# Patient Record
Sex: FEMALE | Race: BLACK OR AFRICAN AMERICAN | NOT HISPANIC OR LATINO | Employment: FULL TIME | URBAN - METROPOLITAN AREA
[De-identification: names, ages, dates, MRNs, and addresses within clinical notes are randomized per-mention and may not be internally consistent; named-entity substitution may affect disease eponyms.]

---
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
## 2020-05-15 ENCOUNTER — RX ONLY (OUTPATIENT)
Age: 20
Setting detail: RX ONLY
End: 2020-05-15

## 2020-05-15 RX ORDER — TRIAMCINOLONE ACETONIDE 1 MG/G
OINTMENT TOPICAL BID
Qty: 1 | Refills: 3 | Status: CANCELLED
Stop reason: CLARIF

## 2020-05-15 RX ORDER — HYDROCORTISONE 25 MG/G
OINTMENT TOPICAL BID
Qty: 1 | Refills: 3 | Status: CANCELLED
Stop reason: CLARIF

## 2020-09-15 ENCOUNTER — APPOINTMENT (RX ONLY)
Dept: URBAN - METROPOLITAN AREA CLINIC 28 | Facility: CLINIC | Age: 20
Setting detail: DERMATOLOGY
End: 2020-09-15

## 2020-09-15 DIAGNOSIS — Z02.9 ENCOUNTER FOR ADMINISTRATIVE EXAMINATIONS, UNSPECIFIED: ICD-10-CM

## 2020-09-15 PROCEDURE — ? REASON FOR TELEMEDICINE VISIT

## 2020-12-17 ENCOUNTER — RX ONLY (OUTPATIENT)
Age: 20
Setting detail: RX ONLY
End: 2020-12-17

## 2020-12-17 RX ORDER — CETIRIZINE HCL 10 MG
CAPSULE ORAL QDAY
Qty: 30 | Refills: 3 | Status: CANCELLED
Stop reason: CLARIF

## 2024-11-20 ENCOUNTER — APPOINTMENT (EMERGENCY)
Dept: RADIOLOGY | Facility: HOSPITAL | Age: 24
End: 2024-11-20
Attending: EMERGENCY MEDICINE
Payer: COMMERCIAL

## 2024-11-20 ENCOUNTER — HOSPITAL ENCOUNTER (OUTPATIENT)
Facility: HOSPITAL | Age: 24
Setting detail: OBSERVATION
Discharge: HOME | End: 2024-11-22
Attending: EMERGENCY MEDICINE | Admitting: STUDENT IN AN ORGANIZED HEALTH CARE EDUCATION/TRAINING PROGRAM
Payer: COMMERCIAL

## 2024-11-20 DIAGNOSIS — R06.02 SHORTNESS OF BREATH: ICD-10-CM

## 2024-11-20 DIAGNOSIS — J45.901 EXACERBATION OF ASTHMA, UNSPECIFIED ASTHMA SEVERITY, UNSPECIFIED WHETHER PERSISTENT: Primary | ICD-10-CM

## 2024-11-20 DIAGNOSIS — R60.0 BILATERAL LEG EDEMA: ICD-10-CM

## 2024-11-20 LAB
ALBUMIN SERPL-MCNC: 4.2 G/DL (ref 3.5–5.7)
ALP SERPL-CCNC: 92 IU/L (ref 34–125)
ALT SERPL-CCNC: 12 IU/L (ref 7–52)
ANION GAP SERPL CALC-SCNC: 5 MEQ/L (ref 3–15)
AST SERPL-CCNC: 12 IU/L (ref 13–39)
BASE EXCESS BLDV CALC-SCNC: -1.8 MEQ/L
BASOPHILS # BLD: 0.05 K/UL (ref 0.01–0.1)
BASOPHILS NFR BLD: 0.4 %
BILIRUB SERPL-MCNC: 0.4 MG/DL (ref 0.3–1.2)
BUN SERPL-MCNC: 8 MG/DL (ref 7–25)
CALCIUM SERPL-MCNC: 9.2 MG/DL (ref 8.6–10.3)
CHLORIDE SERPL-SCNC: 107 MEQ/L (ref 98–107)
CO2 BLDV-SCNC: 25 MEQ/L (ref 22–32)
CO2 SERPL-SCNC: 28 MEQ/L (ref 21–31)
CREAT SERPL-MCNC: 0.6 MG/DL (ref 0.6–1.2)
D DIMER PPP IA.FEU-MCNC: 0.44 UG/ML FEU (ref 0–0.5)
DIFFERENTIAL METHOD BLD: ABNORMAL
EGFRCR SERPLBLD CKD-EPI 2021: >60 ML/MIN/1.73M*2
EOSINOPHIL # BLD: 0.99 K/UL (ref 0.04–0.36)
EOSINOPHIL NFR BLD: 7.5 %
ERYTHROCYTE [DISTWIDTH] IN BLOOD BY AUTOMATED COUNT: 12.9 % (ref 11.7–14.4)
FIO2 ON VENT: ABNORMAL %
GLUCOSE SERPL-MCNC: 96 MG/DL (ref 70–99)
HCO3 BLDV-SCNC: 23.3 MEQ/L (ref 21–28)
HCT VFR BLD AUTO: 41.7 % (ref 35–45)
HGB BLD-MCNC: 13.8 G/DL (ref 11.8–15.7)
IMM GRANULOCYTES # BLD AUTO: 0.04 K/UL (ref 0–0.08)
IMM GRANULOCYTES NFR BLD AUTO: 0.3 %
INHALED O2 CONCENTRATION: ABNORMAL %
LYMPHOCYTES # BLD: 3.67 K/UL (ref 1.2–3.5)
LYMPHOCYTES NFR BLD: 27.7 %
MCH RBC QN AUTO: 28.4 PG (ref 28–33.2)
MCHC RBC AUTO-ENTMCNC: 33.1 G/DL (ref 32.2–35.5)
MCV RBC AUTO: 85.8 FL (ref 83–98)
MONOCYTES # BLD: 0.71 K/UL (ref 0.28–0.8)
MONOCYTES NFR BLD: 5.4 %
NEUTROPHILS # BLD: 7.78 K/UL (ref 1.7–7)
NEUTS SEG NFR BLD: 58.7 %
NRBC BLD-RTO: 0 %
PCO2 BLDV: 42 MM HG (ref 41–51)
PH BLDV: 7.36 [PH] (ref 7.32–7.42)
PLATELET # BLD AUTO: 363 K/UL (ref 150–369)
PMV BLD AUTO: 8.9 FL (ref 9.4–12.3)
PO2 BLDV: 61 MM HG (ref 25–40)
POTASSIUM SERPL-SCNC: 3.7 MEQ/L (ref 3.5–5.1)
PROT SERPL-MCNC: 7.6 G/DL (ref 6–8.2)
RBC # BLD AUTO: 4.86 M/UL (ref 3.93–5.22)
SODIUM SERPL-SCNC: 140 MEQ/L (ref 136–145)
TROPONIN I SERPL HS-MCNC: 3.9 PG/ML
TROPONIN I SERPL HS-MCNC: 4.4 PG/ML
WBC # BLD AUTO: 13.24 K/UL (ref 3.8–10.5)

## 2024-11-20 PROCEDURE — 93005 ELECTROCARDIOGRAM TRACING: CPT

## 2024-11-20 PROCEDURE — 36415 COLL VENOUS BLD VENIPUNCTURE: CPT

## 2024-11-20 PROCEDURE — 84484 ASSAY OF TROPONIN QUANT: CPT | Mod: 91 | Performed by: EMERGENCY MEDICINE

## 2024-11-20 PROCEDURE — G0378 HOSPITAL OBSERVATION PER HR: HCPCS

## 2024-11-20 PROCEDURE — 93005 ELECTROCARDIOGRAM TRACING: CPT | Performed by: EMERGENCY MEDICINE

## 2024-11-20 PROCEDURE — 85025 COMPLETE CBC W/AUTO DIFF WBC: CPT | Performed by: EMERGENCY MEDICINE

## 2024-11-20 PROCEDURE — 82803 BLOOD GASES ANY COMBINATION: CPT | Performed by: EMERGENCY MEDICINE

## 2024-11-20 PROCEDURE — 96375 TX/PRO/DX INJ NEW DRUG ADDON: CPT

## 2024-11-20 PROCEDURE — 96365 THER/PROPH/DIAG IV INF INIT: CPT

## 2024-11-20 PROCEDURE — 85379 FIBRIN DEGRADATION QUANT: CPT | Performed by: EMERGENCY MEDICINE

## 2024-11-20 PROCEDURE — 84484 ASSAY OF TROPONIN QUANT: CPT | Performed by: EMERGENCY MEDICINE

## 2024-11-20 PROCEDURE — 63600000 HC DRUGS/DETAIL CODE: Mod: JZ | Performed by: EMERGENCY MEDICINE

## 2024-11-20 PROCEDURE — 87637 SARSCOV2&INF A&B&RSV AMP PRB: CPT | Performed by: EMERGENCY MEDICINE

## 2024-11-20 PROCEDURE — 99223 1ST HOSP IP/OBS HIGH 75: CPT

## 2024-11-20 PROCEDURE — 99285 EMERGENCY DEPT VISIT HI MDM: CPT | Mod: 25

## 2024-11-20 PROCEDURE — 71045 X-RAY EXAM CHEST 1 VIEW: CPT

## 2024-11-20 PROCEDURE — 63700000 HC SELF-ADMINISTRABLE DRUG

## 2024-11-20 PROCEDURE — 85025 COMPLETE CBC W/AUTO DIFF WBC: CPT

## 2024-11-20 PROCEDURE — 80053 COMPREHEN METABOLIC PANEL: CPT | Performed by: EMERGENCY MEDICINE

## 2024-11-20 PROCEDURE — 25000000 HC PHARMACY GENERAL: Performed by: EMERGENCY MEDICINE

## 2024-11-20 RX ORDER — ALBUTEROL SULFATE 0.83 MG/ML
2.5 SOLUTION RESPIRATORY (INHALATION) EVERY 6 HOURS PRN
Status: DISCONTINUED | OUTPATIENT
Start: 2024-11-20 | End: 2024-11-21

## 2024-11-20 RX ORDER — ACETAMINOPHEN 325 MG/1
650 TABLET ORAL EVERY 4 HOURS PRN
Status: DISCONTINUED | OUTPATIENT
Start: 2024-11-20 | End: 2024-11-22 | Stop reason: HOSPADM

## 2024-11-20 RX ORDER — ALBUTEROL SULFATE 90 UG/1
2 INHALANT RESPIRATORY (INHALATION) 4 TIMES DAILY PRN
Status: ON HOLD | COMMUNITY
End: 2024-11-22

## 2024-11-20 RX ORDER — IPRATROPIUM BROMIDE AND ALBUTEROL SULFATE 2.5; .5 MG/3ML; MG/3ML
6 SOLUTION RESPIRATORY (INHALATION) ONCE
Status: COMPLETED | OUTPATIENT
Start: 2024-11-20 | End: 2024-11-20

## 2024-11-20 RX ORDER — ALBUTEROL SULFATE 0.83 MG/ML
2.5 SOLUTION RESPIRATORY (INHALATION) EVERY 6 HOURS
Status: DISCONTINUED | OUTPATIENT
Start: 2024-11-21 | End: 2024-11-22 | Stop reason: HOSPADM

## 2024-11-20 RX ORDER — DEXTROSE 50 % IN WATER (D50W) INTRAVENOUS SYRINGE
25 AS NEEDED
Status: DISCONTINUED | OUTPATIENT
Start: 2024-11-20 | End: 2024-11-22 | Stop reason: HOSPADM

## 2024-11-20 RX ORDER — DEXTROSE 40 %
15-30 GEL (GRAM) ORAL AS NEEDED
Status: DISCONTINUED | OUTPATIENT
Start: 2024-11-20 | End: 2024-11-22 | Stop reason: HOSPADM

## 2024-11-20 RX ORDER — IBUPROFEN 200 MG
16-32 TABLET ORAL AS NEEDED
Status: DISCONTINUED | OUTPATIENT
Start: 2024-11-20 | End: 2024-11-22 | Stop reason: HOSPADM

## 2024-11-20 RX ADMIN — MAGNESIUM SULFATE IN WATER 2 G: 40 INJECTION, SOLUTION INTRAVENOUS at 21:10

## 2024-11-20 RX ADMIN — IPRATROPIUM BROMIDE AND ALBUTEROL SULFATE 6 ML: .5; 3 SOLUTION RESPIRATORY (INHALATION) at 21:10

## 2024-11-20 RX ADMIN — METHYLPREDNISOLONE SODIUM SUCCINATE 125 MG: 125 INJECTION, POWDER, FOR SOLUTION INTRAMUSCULAR; INTRAVENOUS at 21:10

## 2024-11-20 RX ADMIN — ACETAMINOPHEN 650 MG: 325 TABLET ORAL at 23:20

## 2024-11-20 ASSESSMENT — COGNITIVE AND FUNCTIONAL STATUS - GENERAL
CLIMB 3 TO 5 STEPS WITH RAILING: 4 - NONE
STANDING UP FROM CHAIR USING ARMS: 4 - NONE
MOVING TO AND FROM BED TO CHAIR: 4 - NONE
WALKING IN HOSPITAL ROOM: 4 - NONE

## 2024-11-21 ENCOUNTER — APPOINTMENT (OUTPATIENT)
Dept: RADIOLOGY | Facility: HOSPITAL | Age: 24
Setting detail: OBSERVATION
End: 2024-11-21
Attending: STUDENT IN AN ORGANIZED HEALTH CARE EDUCATION/TRAINING PROGRAM
Payer: COMMERCIAL

## 2024-11-21 PROBLEM — J45.901 EXACERBATION OF ASTHMA, UNSPECIFIED ASTHMA SEVERITY, UNSPECIFIED WHETHER PERSISTENT: Status: ACTIVE | Noted: 2024-11-21

## 2024-11-21 PROBLEM — R60.0 BILATERAL LEG EDEMA: Status: ACTIVE | Noted: 2024-11-21

## 2024-11-21 PROBLEM — Z79.82 ASPIRIN LONG-TERM USE: Status: ACTIVE | Noted: 2024-11-21

## 2024-11-21 LAB
ANION GAP SERPL CALC-SCNC: 8 MEQ/L (ref 3–15)
ATRIAL RATE: 111
B-HCG UR QL: NEGATIVE
BASOPHILS # BLD: 0 K/UL (ref 0.01–0.1)
BASOPHILS NFR BLD: 0 %
BUN SERPL-MCNC: 7 MG/DL (ref 7–25)
CALCIUM SERPL-MCNC: 9 MG/DL (ref 8.6–10.3)
CHLORIDE SERPL-SCNC: 105 MEQ/L (ref 98–107)
CO2 SERPL-SCNC: 24 MEQ/L (ref 21–31)
CREAT SERPL-MCNC: 0.5 MG/DL (ref 0.6–1.2)
DIFFERENTIAL METHOD BLD: ABNORMAL
EGFRCR SERPLBLD CKD-EPI 2021: >60 ML/MIN/1.73M*2
EOSINOPHIL # BLD: 0.09 K/UL (ref 0.04–0.36)
EOSINOPHIL NFR BLD: 1 %
ERYTHROCYTE [DISTWIDTH] IN BLOOD BY AUTOMATED COUNT: 12.8 % (ref 11.7–14.4)
FLUAV RNA SPEC QL NAA+PROBE: NEGATIVE
FLUBV RNA SPEC QL NAA+PROBE: NEGATIVE
GLUCOSE SERPL-MCNC: 189 MG/DL (ref 70–99)
HCT VFR BLD AUTO: 40.3 % (ref 35–45)
HGB BLD-MCNC: 13.5 G/DL (ref 11.8–15.7)
LYMPHOCYTES # BLD: 1.32 K/UL (ref 1.2–3.5)
LYMPHOCYTES NFR BLD: 14 %
MAGNESIUM SERPL-MCNC: 2.1 MG/DL (ref 1.8–2.5)
MCH RBC QN AUTO: 28.9 PG (ref 28–33.2)
MCHC RBC AUTO-ENTMCNC: 33.5 G/DL (ref 32.2–35.5)
MCV RBC AUTO: 86.3 FL (ref 83–98)
MONOCYTES # BLD: 0.09 K/UL (ref 0.28–0.8)
MONOCYTES NFR BLD: 1 %
NEUTS BAND # BLD: 7.93 K/UL (ref 1.7–7)
NEUTS SEG NFR BLD: 84 %
P AXIS: 48
PLAT MORPH BLD: NORMAL
PLATELET # BLD AUTO: 356 K/UL (ref 150–369)
PLATELET # BLD EST: ABNORMAL 10*3/UL
PMV BLD AUTO: 9.7 FL (ref 9.4–12.3)
POTASSIUM SERPL-SCNC: 4.1 MEQ/L (ref 3.5–5.1)
PR INTERVAL: 134
QRS DURATION: 74
QT INTERVAL: 316
QTC CALCULATION(BAZETT): 429
R AXIS: 38
RBC # BLD AUTO: 4.67 M/UL (ref 3.93–5.22)
RBC MORPH BLD: NORMAL
RSV RNA SPEC QL NAA+PROBE: NEGATIVE
SARS-COV-2 RNA RESP QL NAA+PROBE: NEGATIVE
SODIUM SERPL-SCNC: 137 MEQ/L (ref 136–145)
T WAVE AXIS: 40
VENTRICULAR RATE: 111
WBC # BLD AUTO: 9.44 K/UL (ref 3.8–10.5)

## 2024-11-21 PROCEDURE — 93010 ELECTROCARDIOGRAM REPORT: CPT | Performed by: INTERNAL MEDICINE

## 2024-11-21 PROCEDURE — 63600000 HC DRUGS/DETAIL CODE

## 2024-11-21 PROCEDURE — 81025 URINE PREGNANCY TEST: CPT

## 2024-11-21 PROCEDURE — 99233 SBSQ HOSP IP/OBS HIGH 50: CPT | Performed by: STUDENT IN AN ORGANIZED HEALTH CARE EDUCATION/TRAINING PROGRAM

## 2024-11-21 PROCEDURE — 96376 TX/PRO/DX INJ SAME DRUG ADON: CPT

## 2024-11-21 PROCEDURE — 80048 BASIC METABOLIC PNL TOTAL CA: CPT

## 2024-11-21 PROCEDURE — 85025 COMPLETE CBC W/AUTO DIFF WBC: CPT

## 2024-11-21 PROCEDURE — 93970 EXTREMITY STUDY: CPT

## 2024-11-21 PROCEDURE — G0378 HOSPITAL OBSERVATION PER HR: HCPCS

## 2024-11-21 PROCEDURE — 21400000 HC ROOM AND CARE CCU/INTERMEDIATE

## 2024-11-21 PROCEDURE — 25000000 HC PHARMACY GENERAL

## 2024-11-21 PROCEDURE — 36415 COLL VENOUS BLD VENIPUNCTURE: CPT

## 2024-11-21 PROCEDURE — 63700000 HC SELF-ADMINISTRABLE DRUG: Performed by: STUDENT IN AN ORGANIZED HEALTH CARE EDUCATION/TRAINING PROGRAM

## 2024-11-21 PROCEDURE — 83735 ASSAY OF MAGNESIUM: CPT

## 2024-11-21 RX ORDER — ASPIRIN 81 MG/1
81 TABLET ORAL DAILY
Status: DISCONTINUED | OUTPATIENT
Start: 2024-11-21 | End: 2024-11-21

## 2024-11-21 RX ORDER — BUDESONIDE 0.5 MG/2ML
0.5 INHALANT ORAL
Status: DISCONTINUED | OUTPATIENT
Start: 2024-11-21 | End: 2024-11-22 | Stop reason: HOSPADM

## 2024-11-21 RX ORDER — ALBUTEROL SULFATE 0.83 MG/ML
2.5 SOLUTION RESPIRATORY (INHALATION) EVERY 4 HOURS PRN
Status: DISCONTINUED | OUTPATIENT
Start: 2024-11-21 | End: 2024-11-22 | Stop reason: HOSPADM

## 2024-11-21 RX ADMIN — ALBUTEROL SULFATE 2.5 MG: 2.5 SOLUTION RESPIRATORY (INHALATION) at 12:38

## 2024-11-21 RX ADMIN — ALBUTEROL SULFATE 2.5 MG: 2.5 SOLUTION RESPIRATORY (INHALATION) at 00:35

## 2024-11-21 RX ADMIN — METHYLPREDNISOLONE SODIUM SUCCINATE 30 MG: 40 INJECTION, POWDER, FOR SOLUTION INTRAMUSCULAR; INTRAVENOUS at 09:23

## 2024-11-21 RX ADMIN — METHYLPREDNISOLONE SODIUM SUCCINATE 30 MG: 40 INJECTION, POWDER, FOR SOLUTION INTRAMUSCULAR; INTRAVENOUS at 20:48

## 2024-11-21 RX ADMIN — BUDESONIDE 0.5 MG: 0.5 INHALANT RESPIRATORY (INHALATION) at 09:24

## 2024-11-21 RX ADMIN — ALBUTEROL SULFATE 2.5 MG: 2.5 SOLUTION RESPIRATORY (INHALATION) at 06:35

## 2024-11-21 RX ADMIN — BUDESONIDE 0.5 MG: 0.5 INHALANT RESPIRATORY (INHALATION) at 20:46

## 2024-11-21 RX ADMIN — ALBUTEROL SULFATE 2.5 MG: 2.5 SOLUTION RESPIRATORY (INHALATION) at 17:26

## 2024-11-21 RX ADMIN — ASPIRIN 81 MG: 81 TABLET, COATED ORAL at 09:23

## 2024-11-21 NOTE — PROGRESS NOTES
Spoke with patient and confirmed with medication list from [facility] and/or patient's own pharmacy to complete the medication reconciliation.     Prior to admission medication list:    Current Outpatient Medications:     albuterol 5 mg/mL nebulizer solution, Take 2.5 mg by nebulization every 6 (six) hours as needed for shortness of breath., Disp: , Rfl:     albuterol HFA 90 mcg/actuation inhaler, Inhale 2 puffs 4 (four) times a day as needed for wheezing., Disp: , Rfl:     aspirin 81 mg enteric coated tablet, Take 81 mg by mouth daily., Disp: , Rfl:     fluticasone-salmeterol (ADVAIR DISKUS) 250-50 mcg/dose diskus inhaler, Inhale 1 puff 2 (two) times a day. Rinse mouth with water after use to reduce aftertaste and incidence of candidiasis. Do not swallow., Disp: , Rfl:     hydrocortisone 0.5 % ointment, Apply topically 2 (two) times a day., Disp: , Rfl:      Comments about home medications:  -Patient finished course of prednisone.    Compliance:   -Consistent fills, no compliance concerns based on interview

## 2024-11-21 NOTE — ASSESSMENT & PLAN NOTE
Presenting with progressive dyspnea/wheeze consistent with asthma exacerbation, recent prednisone course without relief.   On advair/albuterol prior to admission   - continue IV solumedrol for now   - Standing albuterol nebs/Budesonide nebs  - Outpatient Pulm follow up

## 2024-11-21 NOTE — PLAN OF CARE
Care Coordination Admission Assessment Note    General Information:  Readmission Within the last 30 days: no previous admission in last 30 days  Does patient have a :    Patient-Specific Goals (include timeframe):      Living Arrangements:  Arrived From: home  Current Living Arrangements: home  People in Home: alone  Home Accessibility:    Living Arrangement Comments:      Housing Stability and Utility Access (SDOH):  In the last 12 months, was there a time when you were not able to pay the mortgage or rent on time?: No  In the past 12 months, how many times have you moved?:    At any time in the past 12 months, were you homeless or living in a shelter (including now)?: No  In the past 12 months has the electric, gas, oil, or water company threatened to shut off services in your home?: No    Functional Status Prior to Admission:   Assistive Device/Animal Currently Used at Home: inhaler, nebulizer  Functional Status Comments:    IADL Comments:       Supports and Services:  Current Outpatient/Agency/Support Group:    Type of Current Home Care Services:    History of home care episode or rehab stay: denies    Discharge Needs Assessment:   Concerns to be Addressed: denies needs/concerns at this time  Current Discharge Risk: lives alone, chronically ill  Anticipated Changes Related to Illness: none    Patient/Family Anticipated Discharge Plan:  Patient/Family Anticipates Transition To: home  Patient/Family Anticipated Services at Transition: none    Connection to Community  Not applicable    Patient Choice:   Offered/Gave Vendor List:    Patient's Choice of Community Agency(s):         Anticipated Discharge Plan:  Met with patient. Provided education and contact information for Care Coordination services.: yes  Anticipated Discharge Disposition: home with assistance     Transportation Needs (SDOH):  Transportation Concerns:    Transportation Anticipated: family or friend will provide  Is Out of Hospital  DNR needed at discharge?:      In the past 12 months, has lack of transportation kept you from medical appointments or from getting medications?: No  In the past 12 months, has lack of transportation kept you from meetings, work, or from getting things needed for daily living?: No    Concerns - comments: Met with patient bedside to discuss potential d/c needs. Patient lives alone independently, verified phone numbers and address. Patient does not have a PCP, [facility] list given. Her plan is for home, she has a ride. She is not Covid-vaccinated.

## 2024-11-21 NOTE — PLAN OF CARE
Plan of Care Review  Plan of Care Reviewed With: patient  Progress: improving  Outcome Evaluation: AAOx4. Sinus tach on the monitor; all other VSS. Patient reports ESQUIVEL when ambulating but not at rest. Tolerating room air well. Reported chest and back pain-PRN med administered with positive results. Calm and cooperative. Safety rounding complete. Call bell within reach.

## 2024-11-21 NOTE — ASSESSMENT & PLAN NOTE
Patient reports long-term aspirin use for hx of headaches; No hx of TIA, CAD, PAD  - Recommended that she stop taking daily aspirin for this use  - D/c aspirin on discharge

## 2024-11-21 NOTE — ED PROVIDER NOTES
Emergency Medicine Note  HPI   HISTORY OF PRESENT ILLNESS     HPI patient is a 24-year-old woman with history of asthma.  She has required intubation in the past for her asthma.  She says she was at outside hospital 2 weeks ago for asthma exacerbation.  She was treated with nebs and steroids and discharged home to continue home treatment.  She says she completed treatment, and was never feeling improved.  She has continued to have significant wheezing and feels increased work of breathing and shortness of breath.  She notes no new fevers or chills.  No productive cough.  She gets chest tightness that is typical of asthma exacerbation.  She notes no triggers for this episode.  She has been using her home inhaler around-the-clock without improvement.  Her last steroid dose was 1.5 weeks ago.      Patient History   PAST HISTORY     Reviewed from Nursing Triage:       Past Medical History:   Diagnosis Date    Asthma     Chronic    Chronic eczema     Eczema     Hayfever        Past Surgical History   Procedure Laterality Date    [place] tooth extraction         Family History   Problem Relation Name Age of Onset    Asthma Biological Mother      Asthma Biological Father      Asthma Other         Social History     Tobacco Use    Smoking status: Never    Smokeless tobacco: Never   Substance Use Topics    Alcohol use: No    Drug use: No         Review of Systems   REVIEW OF SYSTEMS     Review of Systems      VITALS     ED Vitals      Date/Time Temp Pulse Resp BP SpO2 [facility]   11/20/24 2106 -- 108 20 131/63 100 % LB   11/20/24 1926 37.5 °C (99.5 °F) 111 21 164/114 97 % GL                         Physical Exam   PHYSICAL EXAM     Physical Exam  Vitals and nursing note reviewed.   Constitutional:       General: She is not in acute distress.     Appearance: She is not ill-appearing or diaphoretic.   HENT:      Head: Normocephalic and atraumatic.      Right Ear: External ear normal.      Left Ear: External ear normal.      Nose:  Nose normal.      Mouth/Throat:      Mouth: Mucous membranes are moist.   Eyes:      Extraocular Movements: Extraocular movements intact.   Cardiovascular:      Rate and Rhythm: Normal rate and regular rhythm.      Pulses: Normal pulses.   Pulmonary:      Effort: Tachypnea and respiratory distress present.      Breath sounds: Wheezing present.      Comments: Inspiratory and expiratory wheezing to bilateral diffuse lungs  Chest:      Chest wall: No mass or tenderness.   Abdominal:      General: There is no distension.      Tenderness: There is no abdominal tenderness.   Musculoskeletal:         General: Normal range of motion.      Cervical back: Normal range of motion.      Right lower leg: No edema.      Left lower leg: No edema.   Skin:     General: Skin is warm and dry.      Coloration: Skin is not cyanotic or pale.   Neurological:      General: No focal deficit present.      Mental Status: She is alert.   Psychiatric:         Mood and Affect: Mood normal.           PROCEDURES     Procedures     DATA     Results       Procedure Component Value Units Date/Time    D-dimer, quantitative [[id number]]  (Normal) Collected: 11/20/24 1931    Specimen: Blood, Venous Updated: 11/20/24 2151     D-Dimer, Quant 0.44 ug/mL FEU      Comment: The D-Dimer assay can be used as an aid in the diagnosis of DVT or PE. The test can not be used by itself to exclude DVT or PE. When used as a diagnostic aid, the cutoff value is the same as the reference range: <0.5 ug/ml FEU.       HS Troponin I (with 2 hour reflex) [[id number]]  (Normal) Collected: 11/20/24 1931    Specimen: Blood, Venous Updated: 11/20/24 2028     High Sens Troponin I 4.4 pg/mL     Comprehensive metabolic panel [[id number]]  (Abnormal) Collected: 11/20/24 1931    Specimen: Blood, Venous Updated: 11/20/24 2022     Sodium 140 mEQ/L      Potassium 3.7 mEQ/L      Comment: Results obtained on plasma. Plasma Potassium values may be up to 0.4 mEQ/L less than serum values. The  differences may be greater for patients with high platelet or white cell counts.        Chloride 107 mEQ/L      CO2 28 mEQ/L      BUN 8 mg/dL      Creatinine 0.6 mg/dL      Glucose 96 mg/dL      Calcium 9.2 mg/dL      AST (SGOT) 12 IU/L      ALT (SGPT) 12 IU/L      Alkaline Phosphatase 92 IU/L      Total Protein 7.6 g/dL      Comment: Test performed on plasma which typically contains approximately 0.4 g/dL more protein than serum.        Albumin 4.2 g/dL      Bilirubin, Total 0.4 mg/dL      eGFR >60.0 mL/min/1.73m*2      Comment: Calculation based on the Chronic Kidney Disease Epidemiology Collaboration (CKD-EPI) equation refit without adjustment for race.        Anion Gap 5 mEQ/L     CBC and differential [[id number]]  (Abnormal) Collected: 11/20/24 1931    Specimen: Blood, Venous Updated: 11/20/24 1958     WBC 13.24 K/uL      RBC 4.86 M/uL      Hemoglobin 13.8 g/dL      Hematocrit 41.7 %      MCV 85.8 fL      MCH 28.4 pg      MCHC 33.1 g/dL      RDW 12.9 %      Platelets 363 K/uL      MPV 8.9 fL      Differential Type Auto     nRBC 0.0 %      Immature Granulocytes 0.3 %      Neutrophils 58.7 %      Lymphocytes 27.7 %      Monocytes 5.4 %      Eosinophils 7.5 %      Basophils 0.4 %      Immature Granulocytes, Absolute 0.04 K/uL      Neutrophils, Absolute 7.78 K/uL      Lymphocytes, Absolute 3.67 K/uL      Monocytes, Absolute 0.71 K/uL      Eosinophils, Absolute 0.99 K/uL      Basophils, Absolute 0.05 K/uL     [place] Draw Panel [[id number]] Collected: 11/20/24 1931    Specimen: Blood, Venous Updated: 11/20/24 1950    Narrative:      The following orders were created for panel order [place] Draw Panel.  Procedure                               Abnormality         Status                     ---------                               -----------         ------                     RAINBOW LT BLUE[[id number]]                                  In process                   Please view results for these tests on the individual  orders.    MAHESH CACERES [[id number]] Collected: 24    Specimen: Blood, Venous Updated: 24            Imaging Results              X-RAY CHEST 1 VIEW (In process)                     ECG 12 lead    (Results Pending)       Scoring tools                                  ED Course & MDM   MDM / ED COURSE / CLINICAL IMPRESSION / DISPO     Medical Decision Making      ED Course as of 24 EK, sinus tach, no significant ST or T wave abnormalities. [TP]    Chest x-ray reviewed by myself.  I did not see any acute infiltrate.  Patient with significant wheezing on exam.  Will reevaluate after nebs, magnesium, steroids. [TP]    Pending callback from hospitalist.  [TP]    Patient is feeling improved in the ED after initial treatments.  Her inspiratory wheezes have improved.  She persists with expiratory wheezes.  She is requiring admission to the hospitalist.  She will require further nebs, steroids.  D-dimer is negative.  Troponin is negative. [TP]    Hospitalist requested VBG [TP]      ED Course User Index  [TP] Tasneem Ponce MD     Clinical Impression      Exacerbation of asthma, unspecified asthma severity, unspecified whether persistent   Shortness of breath     _________________       ED Disposition   Admit / Observation                       Tasneem Ponce MD  24       Tasneem Ponce MD  24

## 2024-11-21 NOTE — PROGRESS NOTES
Hospital Medicine     Daily Progress Note       SUBJECTIVE   Interval History: Patient seen at the bedside this morning. She reports feeling better today in terms of her SOB and wheezing, but her symptoms are still prominent. She also continues to have some minor chest pains. Lastly, she is reporting pains in her bilateral legs, worse in the R leg.      OBJECTIVE      Vital signs in last 24 hours:  Temp:  [36.5 °C (97.7 °F)-37.5 °C (99.5 °F)] 36.9 °C (98.4 °F)  Heart Rate:  [] 89  Resp:  [20-21] 20  BP: (112-164)/() 136/71  No intake or output data in the 24 hours ending 11/21/24 1214    PHYSICAL EXAMINATION      Physical Exam    GEN: Well-appearing, Comfortable, No acute distress, Alert and oriented x3  HEENT: PERRL, EOMI, Moist mucous membranes, Neck supple, No JVD  CV: Regular rate and rhythm, Normal S1/S2, No murmurs, No rubs  PULM:  Non-labored breathing on room air, Diffuse bilateral expiratory > inspiratory wheezing   ABD: Obese, Soft, Nontender, Non-distended, Bowel sounds present   EXT: R > L leg circumference with diffuse bilateral edema and tenderness  NEURO: CN II-XII grossly intact, No focal motor or sensory deficits  : No mata catheter  PSYCH: Appropriate mood and affect, Full range     LINES, CATHETERS, DRAINS, AIRWAYS, AND WOUNDS   Lines, Drains, and Airways:  Wounds (agree with documentation and present on admission):  Peripheral IV (Adult) 11/20/24 Right Antecubital (Active)   Number of days: 1         Comments:    LABS / IMAGING / TELE      Labs  I have reviewed the patient's pertinent labs. Pertinent labs are within normal limits.      Imaging  I have independently reviewed the pertinent imaging from the last 24 hrs.    ECG/Telemetry  I have independently reviewed the telemetry. No events for the last 24 hours.    ASSESSMENT AND PLAN        Assessment & Plan  Acute asthma exacerbation  Patient presenting with progressive dyspnea/wheeze consistent with acute asthma  exacerbation  She failed outpatient management with prednisone course prescribed on 11/7  She reports around-the-clock use of nebs/inhalers at home without relief for the last 2 weeks  She is non-hypoxic here; CXR unremarkable  Unclear trigger, Patient suspects weather change; She does not smoke  - Continue IV Solumedrol 30mg q12h today, Wean to PO prednisone on discharge  - Continue albuterol and budesonide nebs  - Recommend outpatient Pulmonology follow-up  Aspirin long-term use  Patient reports long-term aspirin use for hx of headaches; No hx of TIA, CAD, PAD  - Recommended that she stop taking daily aspirin for this use  - D/c aspirin on discharge  Bilateral leg edema  Bilateral R > L leg edema on exam a/w tenderness  - No e/o DVT on duplex US    VTE Assessment: [place]    VTE Prophylaxis:  Current anticoagulants:    None      Code Status: Full Code      Estimated Discharge Date: 11/22/2024   Disposition Planning: Home tomorrow if improvement in lung exam/symptoms     Maria Del Rosario Lomeli MD  11/21/2024

## 2024-11-21 NOTE — H&P
"   Hospital Medicine  History & Physical        CHIEF COMPLAINT   Shortness of breath      HISTORY OF PRESENT ILLNESS      Hitesh Levy is a 24 y.o. female with a past medical history of asthma who presents with shortness of breath.      Reports recent presentation to ED for asthma exacerbation, D/Cd with prednisone. Reports completed steroid course but never felt improved. Reports she has been using  her inhaler and nebs \"non stop\". Reports yesterday symptoms worsened so she presented to the ED. She reports chest pain and wheeze, no fevers, chills. Reports Hx of intubation in setting of asthma exacerbation. Does not smoke.     In the ED, initial VS with tachycardia and HTN. Initial labs with Cr 0.6, LFTs wnl, no leukocytosis, D dimer wnl VBG 7.36/42, CXR without consolidation. Patient given albuterol neb, IV solumedrol and IV mag in ED, admitted for further management.     PAST MEDICAL AND SURGICAL HISTORY      Past Medical History:   Diagnosis Date    Asthma     Chronic    Chronic eczema     Eczema     Hayfever        Past Surgical History   Procedure Laterality Date    [place] tooth extraction         PCP: [facility], Pcp Missing    MEDICATIONS      Prior to Admission medications    Medication Sig Start Date End Date Taking? Authorizing Provider   albuterol 5 mg/mL nebulizer solution Take 2.5 mg by nebulization every 6 (six) hours as needed for shortness of breath.   Yes Kayli Armstrong MD   albuterol HFA 90 mcg/actuation inhaler Inhale 2 puffs 4 (four) times a day as needed for wheezing.   Yes Leydi Armstrong MD   aspirin 81 mg enteric coated tablet Take 81 mg by mouth daily.   Yes Kayli Armstrong MD   fluticasone-salmeterol (ADVAIR DISKUS) 250-50 mcg/dose diskus inhaler Inhale 1 puff 2 (two) times a day. Rinse mouth with water after use to reduce aftertaste and incidence of candidiasis. Do not swallow.   Yes Kayli Armstrong MD   hydrocortisone 0.5 % ointment Apply " topically 2 (two) times a day.   Yes ProviderKayli MD   prenatal vits96/iron fum/folic (PRE-SHERIDAN VITAMIN) 27 mg iron- 800 mcg tablet Take by mouth daily.  24  Kayli Armstrong MD       ALLERGIES      Egg, Iodine, Nut - unspecified, Penicillins, Red dye, and Shellfish derived    FAMILY HISTORY      Family History   Problem Relation Name Age of Onset    Asthma Biological Mother      Asthma Biological Father      Asthma Other         SOCIAL HISTORY      Social History     Socioeconomic History    Marital status: Single   Tobacco Use    Smoking status: Never    Smokeless tobacco: Never   Substance and Sexual Activity    Alcohol use: No    Drug use: No    Sexual activity: Yes     Partners: Male     Social Drivers of Health     Food Insecurity: No Food Insecurity (2024)    Hunger Vital Sign     Worried About Running Out of Food in the Last Year: Never true     Ran Out of Food in the Last Year: Never true       REVIEW OF SYSTEMS      All other systems reviewed and negative except as noted in HPI    PHYSICAL EXAMINATION      Temp:  [37.5 °C (99.5 °F)] 37.5 °C (99.5 °F)  Heart Rate:  [108-111] 108  Resp:  [20-21] 20  BP: (131-164)/() 131/63  There is no height or weight on file to calculate BMI.    Physical Exam  Constitutional:       Appearance: Normal appearance.   Cardiovascular:      Rate and Rhythm: Regular rhythm. Tachycardia present.      Heart sounds: No murmur heard.     No friction rub. No gallop.   Pulmonary:      Effort: Pulmonary effort is normal.      Breath sounds: Wheezing present. No rhonchi or rales.   Abdominal:      General: Abdomen is flat. There is no distension.      Palpations: Abdomen is soft.      Tenderness: There is no abdominal tenderness.   Musculoskeletal:      Right lower leg: No edema.      Left lower leg: No edema.   Skin:     General: Skin is warm and dry.   Neurological:      Mental Status: She is alert and oriented to person, place, and time.   Psychiatric:          Mood and Affect: Mood normal.         Behavior: Behavior normal.         LABS / IMAGING / EKG        Labs  See above     Imaging  I have independently reviewed the pertinent imaging from the last 24 hrs.      ECG/Telemetry  In process     ASSESSMENT AND PLAN              Assessment & Plan  Acute asthma exacerbation  Presenting with progressive dyspnea/wheeze consistent with asthma exacerbation, recent prednisone course without relief.   On advair/albuterol prior to admission   - continue IV solumedrol for now   - Standing albuterol nebs/Budesonide nebs  - Outpatient Pulm follow up     VTE Assessment: [place]    VTE Prophylaxis: Current anticoagulants:    None      Code Status: Full Code      Discussed advanced care planning.   Estimated Discharge Date: 11/21/2024  Disposition Planning: Home pending improvement in symptoms      Ced Shepard MD  11/20/2024

## 2024-11-21 NOTE — ASSESSMENT & PLAN NOTE
Patient presenting with progressive dyspnea/wheeze consistent with acute asthma exacerbation  She failed outpatient management with prednisone course prescribed on 11/7  She reports around-the-clock use of nebs/inhalers at home without relief for the last 2 weeks  She is non-hypoxic here; CXR unremarkable  Unclear trigger, Patient suspects weather change; She does not smoke  - Continue IV Solumedrol 30mg q12h today, Wean to PO prednisone on discharge  - Continue albuterol and budesonide nebs  - Recommend outpatient Pulmonology follow-up

## 2024-11-22 VITALS
TEMPERATURE: 98.4 F | OXYGEN SATURATION: 98 % | RESPIRATION RATE: 20 BRPM | HEIGHT: 64 IN | BODY MASS INDEX: 50.02 KG/M2 | HEART RATE: 113 BPM | DIASTOLIC BLOOD PRESSURE: 60 MMHG | SYSTOLIC BLOOD PRESSURE: 130 MMHG | WEIGHT: 293 LBS

## 2024-11-22 PROCEDURE — 25000000 HC PHARMACY GENERAL

## 2024-11-22 PROCEDURE — 63700000 HC SELF-ADMINISTRABLE DRUG

## 2024-11-22 PROCEDURE — 63600000 HC DRUGS/DETAIL CODE: Mod: JW

## 2024-11-22 PROCEDURE — 99238 HOSP IP/OBS DSCHRG MGMT 30/<: CPT | Performed by: STUDENT IN AN ORGANIZED HEALTH CARE EDUCATION/TRAINING PROGRAM

## 2024-11-22 PROCEDURE — G0378 HOSPITAL OBSERVATION PER HR: HCPCS

## 2024-11-22 RX ORDER — PREDNISONE 10 MG/1
TABLET ORAL
Qty: 30 TABLET | Refills: 0 | Status: SHIPPED | OUTPATIENT
Start: 2024-11-23 | End: 2024-12-03

## 2024-11-22 RX ORDER — FLUTICASONE PROPIONATE AND SALMETEROL 250; 50 UG/1; UG/1
1 POWDER RESPIRATORY (INHALATION) 2 TIMES DAILY
Qty: 60 EACH | Refills: 0 | Status: SHIPPED | OUTPATIENT
Start: 2024-11-22 | End: 2024-12-22

## 2024-11-22 RX ORDER — ALBUTEROL SULFATE 90 UG/1
2 INHALANT RESPIRATORY (INHALATION) 4 TIMES DAILY PRN
Qty: 8.5 G | Refills: 1 | Status: SHIPPED | OUTPATIENT
Start: 2024-11-22 | End: 2024-12-22

## 2024-11-22 RX ORDER — ALBUTEROL SULFATE 5 MG/ML
2.5 SOLUTION RESPIRATORY (INHALATION) EVERY 4 HOURS PRN
Qty: 20 ML | Refills: 0 | Status: SHIPPED | OUTPATIENT
Start: 2024-11-22 | End: 2024-12-22

## 2024-11-22 RX ADMIN — ALBUTEROL SULFATE 2.5 MG: 2.5 SOLUTION RESPIRATORY (INHALATION) at 06:11

## 2024-11-22 RX ADMIN — ALBUTEROL SULFATE 2.5 MG: 2.5 SOLUTION RESPIRATORY (INHALATION) at 12:09

## 2024-11-22 RX ADMIN — METHYLPREDNISOLONE SODIUM SUCCINATE 30 MG: 40 INJECTION, POWDER, FOR SOLUTION INTRAMUSCULAR; INTRAVENOUS at 09:00

## 2024-11-22 RX ADMIN — ALBUTEROL SULFATE 2.5 MG: 2.5 SOLUTION RESPIRATORY (INHALATION) at 00:33

## 2024-11-22 RX ADMIN — BUDESONIDE 0.5 MG: 0.5 INHALANT RESPIRATORY (INHALATION) at 09:00

## 2024-11-22 RX ADMIN — ACETAMINOPHEN 650 MG: 325 TABLET ORAL at 00:32

## 2024-11-22 ASSESSMENT — COGNITIVE AND FUNCTIONAL STATUS - GENERAL
STANDING UP FROM CHAIR USING ARMS: 4 - NONE
CLIMB 3 TO 5 STEPS WITH RAILING: 4 - NONE
MOVING TO AND FROM BED TO CHAIR: 4 - NONE
WALKING IN HOSPITAL ROOM: 4 - NONE

## 2024-11-22 NOTE — DISCHARGE INSTRUCTIONS
Ms. Levy,    You [place] [facility] [place] for an asthma exacerbation of which you failed outpatient management for 2 weeks prior to admission. You were treated here with intravenous steroids and nebulizers with improvement. You are now stable for discharge home. You will complete a new prednisone (steroid) taper over the course of 10 days. Please take as prescribed. We also refilled your Advair inhalers and albuterol neb/inhalers. Please use albuterol as needed for wheezing and shortness of breath.    We recommend you start seeing a PCP and establish with a lung doctor (Pulmonologist) for long-term management of your asthma.    You can stop taking baby aspirin.    To Do:  - Schedule a new patient appointment with the [facility] Internal Medicine Office if you need to obtain a PCP   - Schedule a new patient appointment with the [facility] Office for long-term management of your asthma and to determine if you are a candidate for additional therapies     It was a pleasure taking care of you! Please bring this discharge paperwork to all follow-up visits.

## 2024-11-22 NOTE — NURSING NOTE
Patient discharged to home, dischrage instruction given , fabian discontinued and cardiac monitoring discontinued.

## 2024-11-22 NOTE — DISCHARGE SUMMARY
Hospital Medicine    Inpatient Discharge Summary        BRIEF OVERVIEW     Patient: Hitesh Levy  Admission Date: 2024   : 2000 Discharge Date: 2024       PCP: [facility], Pcp Missing  Disposition: Home     Destination: Home     Attending Provider: Maria Del Rosario Lomeli MD Attending phys phone: [phone]    Code Status At Discharge: Full Code        ASSESSMENT AND PLAN     Brief Hospital Course    Ms. Levy is a 23yo woman with a history of asthma who presented with a 2 week asthma exacerbation which did not resolve with outpatient steroid course plus albuterol nebs/inhaler. She was admitted here and treated with IV steroids and nebulizers with improvement. She is now stable for discharge home. She is being discharged with a new, long (10-day long) prednisone taper. She is recommended to follow-up outpatient with a PCP and Pulm. Her inhalers/nebs were refilled on discharge.    She was recommended to stop taking baby aspirin as there was no indication for this.    Exam on Day of Discharge  Temp:  [36.4 °C (97.5 °F)-37 °C (98.6 °F)] 36.4 °C (97.5 °F)  Heart Rate:  [] 103  Resp:  [18-20] 20  BP: (127-136)/(62-71) 133/63    Physical Exam    GEN: Well-appearing, Comfortable, No acute distress, Alert and oriented x3  HEENT: PERRL, EOMI, Moist mucous membranes, Neck supple, No JVD  CV: Regular rate and rhythm, Normal S1/S2, No murmurs, No rubs  PULM:  Non-labored breathing on room air, Diffuse bilateral expiratory > inspiratory wheezing (improved)  ABD: Obese, Soft, Nontender, Non-distended, Bowel sounds present   EXT: R > L leg circumference   NEURO: CN II-XII grossly intact, No focal motor or sensory deficits  : No mata catheter  PSYCH: Appropriate mood and affect, Full range    DISCHARGE MEDICATIONS         Medication List        START taking these medications      predniSONE 10 mg tablet  Commonly known as: DELTASONE  Start taking on: 2024  Take 5 tablets (50  mg total) by mouth daily for 2 days, THEN 4 tablets (40 mg total) daily for 2 days, THEN 3 tablets (30 mg total) daily for 2 days, THEN 2 tablets (20 mg total) daily for 2 days, THEN 1 tablet (10 mg total) daily for 2 days.            CHANGE how you take these medications      * albuterol 5 mg/mL nebulizer solution  0.5 mL (2.5 mg total) by continuous nebulization route every 4 (four) hours as needed for shortness of breath or wheezing.  Dose: 2.5 mg  What changed:   how to take this  when to take this  reasons to take this     * albuterol HFA 90 mcg/actuation inhaler  Inhale 2 puffs 4 (four) times a day as needed for wheezing.  Dose: 2 puff  What changed: Another medication with the same name was changed. Make sure you understand how and when to take each.           * This list has 2 medication(s) that are the same as other medications prescribed for you. Read the directions carefully, and ask your doctor or other care provider to review them with you.                CONTINUE taking these medications      fluticasone propion-salmeteroL 250-50 mcg/dose diskus inhaler  Commonly known as: ADVAIR DISKUS  Inhale 1 puff 2 (two) times a day. Rinse mouth with water after use to reduce aftertaste and incidence of candidiasis. Do not swallow.  Dose: 1 puff     hydrocortisone 0.5 % ointment  Apply topically 2 (two) times a day.            STOP taking these medications      aspirin 81 mg enteric coated tablet             INSTRUCTIONS AND FOLLOW-UP       Recommended Follow-up Visits  Follow-Up After Discharge       [facility], Pcp Missing   Relationship: PCP - General        Follow up    [facility] Internal Medicine   Specialty: Internal Medicine        Follow up in 1 week(s)    Service: Schedule a new patient appointment with the [facility] Internal Medicine Office if you need to obtain a PCP    [facility] Pulmonology        Follow up in 4 week(s)    Service: Schedule a new patient appointment with the [facility]  Pulmonology Office for long-term management of your asthma and to determine if you are a candidate for additional therapies            LABS AND IMAGING   Pertinent Labs  CHEMISTRIES   Results from last 7 days   Lab Units 11/21/24  0407 11/20/24 1931   SODIUM mEQ/L 137 140   CHLORIDE mEQ/L 105 107   CO2 mEQ/L 24 28   BUN mg/dL 7 8   CREATININE mg/dL 0.5* 0.6   GLUCOSE mg/dL 189* 96   CALCIUM mg/dL 9.0 9.2   EGFR mL/min/1.73m*2 >60.0 >60.0   ANION GAP mEQ/L 8 5   MAGNESIUM mg/dL 2.1  --      CBC RESULTS   Results from last 7 days   Lab Units 11/21/24 0407 11/20/24 1931   WBC K/uL 9.44 13.24*   HEMOGLOBIN g/dL 13.5 13.8   HEMATOCRIT % 40.3 41.7   PLATELETS K/uL 356 363       Pertinent Imaging  Ultrasound venous leg bilateral    Result Date: 11/21/2024  IMPRESSION: No evidence of femoropopliteal deep venous thrombosis bilaterally.     X-RAY CHEST 1 VIEW    Result Date: 11/21/2024  IMPRESSION: No acute abnormality.       OTHER SERVICES PROVIDED   Consults During Admission  None    Procedures Performed        Thank you for allowing the Division of Hospital Medicine to care for your patient.

## 2024-11-26 NOTE — [facility] PHYSICIAN REVIEW NOTE
Utilization Secondary Review Note      Patient Name: Hitesh Levy      MRN: [id number]]    Admission: 11/20 - 11/22   Presented w/ wheezing and dyspnea.   Hx of asthma; admitted 2 weeks PTA to OSH w/ asthma exac.   Tachy 110s, /114, RR 21   WBC 13k   IV Solumedrol   Standing nebs    11/21 - dyspnea w/ exertion   Still mild chest pain   IV Solumedrol Q12   Nebs   Tachy 110s    11/22 - discharged        A request will be placed to payor for Peer to Peer.    Karey Askew MD  11/26/2024

## 2024-11-26 NOTE — [facility] PHYSICIAN REVIEW NOTE
Discussed case w/ Dr. Wagner.  Denial upheld. Will not send for appeal.    Karey Askew MD  11/26/24